# Patient Record
Sex: FEMALE | Race: WHITE | NOT HISPANIC OR LATINO | Employment: UNEMPLOYED | URBAN - METROPOLITAN AREA
[De-identification: names, ages, dates, MRNs, and addresses within clinical notes are randomized per-mention and may not be internally consistent; named-entity substitution may affect disease eponyms.]

---

## 2024-05-21 ENCOUNTER — TELEPHONE (OUTPATIENT)
Dept: ALLERGY | Facility: CLINIC | Age: 34
End: 2024-05-21
Payer: COMMERCIAL

## 2024-05-21 NOTE — TELEPHONE ENCOUNTER
----- Message from Samia Watts sent at 5/21/2024  8:55 AM CDT -----  Who Called: Pt    What is the request in detail: Requesting call back to discuss if appt can be scheduled for a later time, pt will still come if not. Please advise    Can the clinic reply by [facility]? No    Best Call Back Number: [phone]      Additional Information:

## 2024-05-22 ENCOUNTER — OFFICE VISIT (OUTPATIENT)
Dept: ALLERGY | Facility: CLINIC | Age: 34
End: 2024-05-22
Payer: COMMERCIAL

## 2024-05-22 ENCOUNTER — LAB VISIT (OUTPATIENT)
Dept: LAB | Facility: HOSPITAL | Age: 34
End: 2024-05-22
Attending: SPECIALIST
Payer: COMMERCIAL

## 2024-05-22 VITALS
DIASTOLIC BLOOD PRESSURE: 77 MMHG | TEMPERATURE: 98 F | HEART RATE: 80 BPM | WEIGHT: 173.75 LBS | SYSTOLIC BLOOD PRESSURE: 121 MMHG

## 2024-05-22 DIAGNOSIS — M54.9 BACK PAIN, UNSPECIFIED BACK LOCATION, UNSPECIFIED BACK PAIN LATERALITY, UNSPECIFIED CHRONICITY: ICD-10-CM

## 2024-05-22 DIAGNOSIS — E50.7 XEROPHTHALMIA: ICD-10-CM

## 2024-05-22 DIAGNOSIS — Z91.013 SHELLFISH ALLERGY: Primary | ICD-10-CM

## 2024-05-22 DIAGNOSIS — Z91.013 FISH ALLERGY: ICD-10-CM

## 2024-05-22 DIAGNOSIS — Z91.013 SHELLFISH ALLERGY: ICD-10-CM

## 2024-05-22 DIAGNOSIS — J31.0 PERENNIAL NON-ALLERGIC RHINITIS: ICD-10-CM

## 2024-05-22 DIAGNOSIS — Z91.013 ALLERGY TO SEAFOOD: ICD-10-CM

## 2024-05-22 DIAGNOSIS — R53.81 MALAISE: ICD-10-CM

## 2024-05-22 LAB
ALBUMIN SERPL BCP-MCNC: 4.3 G/DL (ref 3.5–5.2)
ALP SERPL-CCNC: 64 U/L (ref 55–135)
ALT SERPL W/O P-5'-P-CCNC: 24 U/L (ref 10–44)
ANION GAP SERPL CALC-SCNC: 7 MMOL/L (ref 8–16)
AST SERPL-CCNC: 22 U/L (ref 10–40)
BASOPHILS # BLD AUTO: 0.05 K/UL (ref 0–0.2)
BASOPHILS NFR BLD: 0.7 % (ref 0–1.9)
BILIRUB SERPL-MCNC: 0.9 MG/DL (ref 0.1–1)
BUN SERPL-MCNC: 16 MG/DL (ref 6–20)
CALCIUM SERPL-MCNC: 9.2 MG/DL (ref 8.7–10.5)
CHLORIDE SERPL-SCNC: 106 MMOL/L (ref 95–110)
CO2 SERPL-SCNC: 26 MMOL/L (ref 23–29)
CREAT SERPL-MCNC: 0.9 MG/DL (ref 0.5–1.4)
CRP SERPL-MCNC: 3 MG/L (ref 0–8.2)
DIFFERENTIAL METHOD BLD: ABNORMAL
EOSINOPHIL # BLD AUTO: 0.2 K/UL (ref 0–0.5)
EOSINOPHIL NFR BLD: 2.7 % (ref 0–8)
ERYTHROCYTE [DISTWIDTH] IN BLOOD BY AUTOMATED COUNT: 12.4 % (ref 11.5–14.5)
ERYTHROCYTE [SEDIMENTATION RATE] IN BLOOD BY PHOTOMETRIC METHOD: 4 MM/HR (ref 0–36)
EST. GFR  (NO RACE VARIABLE): >60 ML/MIN/1.73 M^2
GLUCOSE SERPL-MCNC: 70 MG/DL (ref 70–110)
HCT VFR BLD AUTO: 41.4 % (ref 37–48.5)
HGB BLD-MCNC: 13.5 G/DL (ref 12–16)
IMM GRANULOCYTES # BLD AUTO: 0.03 K/UL (ref 0–0.04)
IMM GRANULOCYTES NFR BLD AUTO: 0.4 % (ref 0–0.5)
LYMPHOCYTES # BLD AUTO: 1.9 K/UL (ref 1–4.8)
LYMPHOCYTES NFR BLD: 27 % (ref 18–48)
MCH RBC QN AUTO: 32.3 PG (ref 27–31)
MCHC RBC AUTO-ENTMCNC: 32.6 G/DL (ref 32–36)
MCV RBC AUTO: 99 FL (ref 82–98)
MISCELLANEOUS TEST NAME: NORMAL
MONOCYTES # BLD AUTO: 0.5 K/UL (ref 0.3–1)
MONOCYTES NFR BLD: 7.2 % (ref 4–15)
NEUTROPHILS # BLD AUTO: 4.4 K/UL (ref 1.8–7.7)
NEUTROPHILS NFR BLD: 62 % (ref 38–73)
NRBC BLD-RTO: 0 /100 WBC
PLATELET # BLD AUTO: 231 K/UL (ref 150–450)
PMV BLD AUTO: 10.3 FL (ref 9.2–12.9)
POTASSIUM SERPL-SCNC: 3.7 MMOL/L (ref 3.5–5.1)
PROT SERPL-MCNC: 7.6 G/DL (ref 6–8.4)
RBC # BLD AUTO: 4.18 M/UL (ref 4–5.4)
RHEUMATOID FACT SERPL-ACNC: <13 IU/ML (ref 0–15)
SODIUM SERPL-SCNC: 139 MMOL/L (ref 136–145)
WBC # BLD AUTO: 7.04 K/UL (ref 3.9–12.7)

## 2024-05-22 PROCEDURE — 86235 NUCLEAR ANTIGEN ANTIBODY: CPT | Performed by: SPECIALIST

## 2024-05-22 PROCEDURE — 99999 PR PBB SHADOW E&M-EST. PATIENT-LVL III: CPT | Mod: PBBFAC,,, | Performed by: SPECIALIST

## 2024-05-22 PROCEDURE — 86003 ALLG SPEC IGE CRUDE XTRC EA: CPT | Mod: 59 | Performed by: SPECIALIST

## 2024-05-22 PROCEDURE — 1159F MED LIST DOCD IN RCRD: CPT | Mod: CPTII,S$GLB,, | Performed by: SPECIALIST

## 2024-05-22 PROCEDURE — 99205 OFFICE O/P NEW HI 60 MIN: CPT | Mod: 25,S$GLB,, | Performed by: SPECIALIST

## 2024-05-22 PROCEDURE — 86431 RHEUMATOID FACTOR QUANT: CPT | Performed by: SPECIALIST

## 2024-05-22 PROCEDURE — 3074F SYST BP LT 130 MM HG: CPT | Mod: CPTII,S$GLB,, | Performed by: SPECIALIST

## 2024-05-22 PROCEDURE — 86140 C-REACTIVE PROTEIN: CPT | Performed by: SPECIALIST

## 2024-05-22 PROCEDURE — 86235 NUCLEAR ANTIGEN ANTIBODY: CPT | Mod: 59 | Performed by: SPECIALIST

## 2024-05-22 PROCEDURE — 1160F RVW MEDS BY RX/DR IN RCRD: CPT | Mod: CPTII,S$GLB,, | Performed by: SPECIALIST

## 2024-05-22 PROCEDURE — 86038 ANTINUCLEAR ANTIBODIES: CPT | Performed by: SPECIALIST

## 2024-05-22 PROCEDURE — 80053 COMPREHEN METABOLIC PANEL: CPT | Performed by: SPECIALIST

## 2024-05-22 PROCEDURE — 3078F DIAST BP <80 MM HG: CPT | Mod: CPTII,S$GLB,, | Performed by: SPECIALIST

## 2024-05-22 PROCEDURE — 85652 RBC SED RATE AUTOMATED: CPT | Performed by: SPECIALIST

## 2024-05-22 PROCEDURE — 81374 HLA I TYPING 1 ANTIGEN LR: CPT | Performed by: SPECIALIST

## 2024-05-22 PROCEDURE — 85025 COMPLETE CBC W/AUTO DIFF WBC: CPT | Performed by: SPECIALIST

## 2024-05-22 PROCEDURE — 36415 COLL VENOUS BLD VENIPUNCTURE: CPT | Performed by: SPECIALIST

## 2024-05-22 PROCEDURE — 95004 PERQ TESTS W/ALRGNC XTRCS: CPT | Mod: S$GLB,,, | Performed by: SPECIALIST

## 2024-05-22 PROCEDURE — 86003 ALLG SPEC IGE CRUDE XTRC EA: CPT | Performed by: SPECIALIST

## 2024-05-22 RX ORDER — EPINEPHRINE 0.3 MG/.3ML
1 INJECTION SUBCUTANEOUS ONCE
Qty: 2 EACH | Refills: 1 | Status: SHIPPED | OUTPATIENT
Start: 2024-05-22 | End: 2024-05-22

## 2024-05-22 NOTE — PROGRESS NOTES
Subjective:       Patient ID: Suze Portillo is a 34 y.o. female.    Chief Complaint:    NEW PATIENT-- HAD HAD SYSTEMIC REACTIONS- MULTIPLE TIMES AFTER EATING FISH AND SHELL FISH- ( EXCEPT CRAWFISH ).  PERSISTENT EYE ALLERGY SYMPTOME- SINCE OCTOBER 2023  HPI:     female, native of [place], resident of [place], [place].  Suze always had allergy to shellfish , except crawfish and various fish. Had to use Epipen on multiple occasions in the past for systemic reactions.  I would evaluate her .    She has mild seasonal  nasal allergies, over the years.  Had been wearing contacts for 25 plus years.   Had had bilateral eye allergies since October 2023.- No apparent reasons- - has dry, itchy, sore eyes without photophobia. Had seen her optometrist at the [facility] in [place], [place] - thrice . LIFE- LONG CONTACT LENS WEARER , ALSO WEARS GLASSES. Contacts were changed, they do not bother her. Benadryl and Zyrtec and Pataday did not help. Only LUMIFY/ steroid eye drops are helping. Changes make up and personal care products.  Patch testing was not done.  [place] had an indoor dog. 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  Hobbies : Her children   Always had seasonal allergies- Never tested for allergies. No history of eczema . Outgrew asthma by 10 years of agethat she had as a child.  MGM had asthma.  Never vaped or smoked cigarettes. No ongoing allergens or irritants exposure.  Would like to get evaluated.           Environmental History: Dust Mite Controls: Dust mite controls are not in place.     Review of Systems   Constitutional: Negative.    HENT:  Positive for congestion.    Eyes:  Positive for redness and itching.   Respiratory: Negative.     Cardiovascular: Negative.    Gastrointestinal: Negative.    Endocrine: Negative.    Genitourinary: Negative.    Musculoskeletal: Negative.    Skin: Negative.    Allergic/Immunologic: Positive for environmental allergies.   Neurological: Negative.     Hematological: Negative.    Psychiatric/Behavioral: Negative.       Objective:     Visit Vitals  /77 (BP Location: Left arm, Patient Position: Sitting)   Pulse 80   Temp 98 °F (36.7 °C)   Wt 78.8 kg (173 lb 11.6 oz)   LMP 05/01/2024 (Approximate)       Physical Exam  Vitals and nursing note reviewed. Exam conducted with a chaperone present.   Constitutional:       Appearance: Normal appearance. She is normal weight.   HENT:      Head: Normocephalic and atraumatic.      Right Ear: Tympanic membrane, ear canal and external ear normal.      Left Ear: Tympanic membrane, ear canal and external ear normal.      Nose: Nose normal.      Mouth/Throat:      Mouth: Mucous membranes are dry.      Pharynx: Oropharynx is clear.   Eyes:      Extraocular Movements: Extraocular movements intact.      Conjunctiva/sclera: Conjunctivae normal.      Pupils: Pupils are equal, round, and reactive to light.   Cardiovascular:      Rate and Rhythm: Normal rate and regular rhythm.      Pulses: Normal pulses.      Heart sounds: Normal heart sounds.   Pulmonary:      Effort: Pulmonary effort is normal.      Breath sounds: Normal breath sounds.   Abdominal:      General: Abdomen is flat. Bowel sounds are normal.      Palpations: Abdomen is soft.   Musculoskeletal:         General: Normal range of motion.      Cervical back: Normal range of motion and neck supple.   Skin:     General: Skin is warm and dry.      Capillary Refill: Capillary refill takes less than 2 seconds.   Neurological:      General: No focal deficit present.      Mental Status: She is alert and oriented to person, place, and time. Mental status is at baseline.   Psychiatric:         Mood and Affect: Mood normal.         Behavior: Behavior normal.         Thought Content: Thought content normal.         Judgment: Judgment normal.       Assessment:      1. Shellfish allergy    2. Fish allergy    3. Back pain, unspecified back location, unspecified back pain laterality,  unspecified chronicity    4. Xerophthalmia    5. Malaise    6. Perennial non-allergic rhinitis    7       wEARING cONACT LENS FOR DECADES- ALSO WEAR EYE GLASSES.    Plan:     Allergy Skin Tests done- results discussed- Forty- SPTs were applied. Histamine was 13 X 13mm and Saline control 0 X0 mm - Perennial Rye grass 3 X - X 3 mm. Everything else was normal.  --------------------------------------------------------------------------------------------------------------------------------------------------------------------------------------  Contact lens weearer.  Systane gel at nights.  Systane ultra 2 drops tid.  In the past Benadryl, Zyrtec and Pataday did not work.  Consult Jorge Luis Jaquez MD, Ophthalmologist-- for chronic dry eyes, itchy, red and achy eyes since October 2023.  [facility] [place] IN THE PAST.  ------------------------------------------------------------------------------------------------------------------------------  Avoid fish and shellfish ( exceopt eaten crawfish without allergy symptoms )- had multiple systemic reactions after eating fish and shellfish in the past.  Screen for Sjogren's- Screen HLA- B 27- due to chronic back pain of decades and eye symptoms.  ? POTENTIALLY DO T.R.U.E Test- PATCH TESTS.  Follow up in 4 weeks- discuss lab tests results  Wears Contacts and eye glasses                  Problems Address                                                 Amount and/or Complexity                                                                      Risk       3           [] 2 or more self-limited or minor problems                      [] Limited                                                                        [] Low                  [] 1 stable chronic illness                                                  Any combination of the two                                               OTC drugs                  []Acute, uncomplicated illness  or injury                            Review of prior external notes from unique source           Minor surgery with no risk factors                                                                                                               [] 1 []2  []3+                                                                                                              Review of results from each unique test                                                                                                               [] 1 []2  [] 3+                                                                                                              Order of each unique test                                                                                                               [] 1 []2  [] 3+                                                                                                              Or                                                                                                             [] Assessment requiring an independent historian      4            [] One or more chronic illness with exacerbation,              [] Moderate                                                                      [] Moderate                 Progression, or side effects of treatment                            -test documents or independent historians                        Prescription drug management                []  2 or more sable chronic illnesses                                    [] Independent interpretation of tests                              Minor surgery with identifiable risk                [] 1 undiagnosed new problem with uncertain prognosis    [] Discussion or management of test results                    elective major surgery                [] 1 acute illness with                systemic symptoms                                                                                                                                                               [] 1 acute complicated injury                                                                                                                                          Elective major surgery                                                                                                                                                                                                                                                                                                                                                                                                  5            [x] 1 or more chronic illnesses with severe exacerbation,     [x] Extensive(two from below)                                         [x] High                                                                                                               [] Independent interpretation of results                         Drug therapy requiring intensive                                                                                                               []Discussion of management or test interpretation           monitoring                                                                                                                                                                                                       Decision to de-escalate care                 [] 1 acute or chronic illness or injury that poses a threat                                                                                               Decision regarding hospitalization

## 2024-05-23 ENCOUNTER — TELEPHONE (OUTPATIENT)
Dept: ALLERGY | Facility: CLINIC | Age: 34
End: 2024-05-23
Payer: COMMERCIAL

## 2024-05-23 LAB — ANA SER QL IF: NORMAL

## 2024-05-23 NOTE — TELEPHONE ENCOUNTER
----- Message from Sam Ward sent at 5/23/2024  2:25 PM CDT -----  Regarding: pt callback  Name of Who is Calling:Pt         What is the request in detail: Requesting reschedule for next open appt           Can the clinic reply by [facility]: no         What [facility]:Telephone Information:  Mobile          [phone]

## 2024-05-24 LAB
ANTI-SSA ANTIBODY: 0.08 RATIO (ref 0–0.99)
ANTI-SSA INTERPRETATION: NEGATIVE
ANTI-SSB ANTIBODY: 0.07 RATIO (ref 0–0.99)
ANTI-SSB INTERPRETATION: NEGATIVE

## 2024-05-29 LAB
CATFISH IGE QN: 17.2 KU/L
CLAM IGE QN: <0.1 KU/L
CRAB IGE QN: <0.1 KU/L
CRAWFISH IGE QN: <0.1 KU/L
DEPRECATED CATFISH IGE RAST QL: ABNORMAL
DEPRECATED CLAM IGE RAST QL: NORMAL
DEPRECATED CRAB IGE RAST QL: NORMAL
DEPRECATED CRAWFISH IGE RAST QL: NORMAL
DEPRECATED LOBSTER IGE RAST QL: NORMAL
DEPRECATED OYSTER IGE RAST QL: NORMAL
DEPRECATED SCALLOP IGE RAST QL: NORMAL
DEPRECATED SHRIMP IGE RAST QL: NORMAL
DEPRECATED TILAPIA IGE RAST QL: ABNORMAL
DEPRECATED TROUT IGE RAST QL: ABNORMAL
DEPRECATED TUNA IGE RAST QL: ABNORMAL
LOBSTER IGE QN: <0.1 KU/L
OYSTER IGE QN: <0.1 KU/L
SCALLOP IGE QN: <0.1 KU/L
SHRIMP IGE QN: <0.1 KU/L
TILAPIA IGE QN: 18 KU/L
TROUT IGE QN: 8.02 KU/L
TUNA IGE QN: 2.41 KU/L

## 2024-06-03 LAB
ALLERGEN NAME: NORMAL
ALLERGEN RESULT: NORMAL

## 2024-06-04 ENCOUNTER — TELEPHONE (OUTPATIENT)
Dept: ALLERGY | Facility: CLINIC | Age: 34
End: 2024-06-04
Payer: COMMERCIAL

## 2024-06-04 NOTE — TELEPHONE ENCOUNTER
----- Message from Maricarmen Oconnell sent at 6/4/2024  9:06 AM CDT -----  Contact: Self  Type:  Patient Returning Call    Who Called:  Patient  Who Left Message for Patient:  Shannan  Does the patient know what this is regarding?:  Yes  Best Call Back Number:  [phone]  Additional Information:  Thank You

## 2024-06-04 NOTE — TELEPHONE ENCOUNTER
----- Message from Jaun Lozoya sent at 6/4/2024  9:46 AM CDT -----  Contact: self  Patient Suze Portillo is requesting a correspondence regarding lab results discussed earlier; she wants office to emailed them to her at [email or web address]. Please call back at [phone]

## 2024-06-04 NOTE — TELEPHONE ENCOUNTER
Spoke with pt. Results conveyed as follows, per Dr Osborne:    1 Does not have any rheumatologic disease--  SLE, RA, Sjogren's.  2 Not allergic to any shell fish- no allergy to  shrimp, crab, crawfish, lobster, oyster, clam      and scallop.all of them. Can eat them  3  Class 4 Tilapia, class - 3 Trout and Class 2 Tuna. EXTREMELY ALLERGIC TO ALL FISH.        Do not eat fish- ANY fish.  4    Normal CBC and CMP.  Must have Epipen Twinpack available always.    Pt voiced understanding. Had to cancel upcoming follow up appt due to being out of town but will call back to reschedule.

## 2024-06-04 NOTE — TELEPHONE ENCOUNTER
Spoke with pt. Advised that I could not email results but they are available on her MyChart. I emailed her the link. I also advised that I could print them out for her and she could pick them up if she so chooses. She will see if she can view them on her MyChart and let me know if she has any issues.

## 2024-06-04 NOTE — TELEPHONE ENCOUNTER
----- Message from Richard Osborne MD sent at 6/3/2024  6:42 PM CDT -----  [Please convey.  1 Does not have any rheumatologic disease--  SLE, RA, Sjogren's.  2 Not allergic to any shell fish- no allergy to  shrimp, crab, crawfish, lobster, oyster, clam      and scallop.all of them. Can eat them  3  Class 4 Tilapia, class - 3 Trout and Class 2 Tuna. EXTREMELY ALLERGIC TO ALL FISH.        Do not eat fish- ANY fish.  4    Normal CBC and CMP.  Must have Epipen Twinpack available always.  ----- Message -----  From: Bruno, [facility]  Sent: 5/22/2024   4:12 PM CDT  To: Richard Osborne MD

## 2024-06-19 LAB
HLA B27 INTERPRETATION: NORMAL
HLA-B27 RELATED AG QL: NEGATIVE
HLA-B27 RELATED AG QL: NORMAL

## 2024-06-24 ENCOUNTER — TELEPHONE (OUTPATIENT)
Dept: ALLERGY | Facility: CLINIC | Age: 34
End: 2024-06-24
Payer: COMMERCIAL

## 2024-06-24 NOTE — TELEPHONE ENCOUNTER
----- Message from Richard Osborne MD sent at 6/19/2024 11:04 AM CDT -----    PLEASE CONVEY.   ALLERGIC TO ALL FISH-- Catfish- class 3, Tilapia - class 4. Trout- class 3 and Tuna Class - 2 Immuno CAP graded- on a scale class 0- to- class 6.  -----------------------------------------------------------  CLASS -0 Negative to ALL  shell fish.  Normal CBC and CMP,  No evidence of rheumatologic diseases- Normal HLA- B 27, MAREN, RF. Anti- SS-A AND Anti- SSB    Avoid ALL FISH.  Must have Epipen. May call in prescriptions.  ----- Message -----  From: Bruno, [facility]  Sent: 5/22/2024   4:12 PM CDT  To: Richard Osborne MD

## 2024-12-12 ENCOUNTER — PATIENT MESSAGE (OUTPATIENT)
Dept: RESEARCH | Facility: HOSPITAL | Age: 34
End: 2024-12-12
Payer: COMMERCIAL